# Patient Record
Sex: FEMALE | URBAN - METROPOLITAN AREA
[De-identification: names, ages, dates, MRNs, and addresses within clinical notes are randomized per-mention and may not be internally consistent; named-entity substitution may affect disease eponyms.]

---

## 2024-06-12 ENCOUNTER — TELEPHONE (OUTPATIENT)
Dept: SURGERY | Age: 49
End: 2024-06-12

## 2024-06-12 NOTE — TELEPHONE ENCOUNTER
[facility] called inuring about a colonoscopy that patient had at [facility] with Dr. Hawk. Informed her that we no longer have access to those records and will need to contact [facility]. Fax number was provided. No further questions at this time.     Jose Drummond., MA

## 2024-08-06 ENCOUNTER — TELEPHONE (OUTPATIENT)
Dept: SURGERY | Age: 49
End: 2024-08-06

## 2024-08-06 NOTE — TELEPHONE ENCOUNTER
Tried to call the pt to clarify a fax received and get a verbal release of information and it was a wrong number. Tried calling the office and it was closed. Faxed over a request to [facility] for a CAR[facility] and updated demographics on pt.

## 2024-08-09 ENCOUNTER — TELEPHONE (OUTPATIENT)
Dept: PULMONOLOGY | Age: 49
End: 2024-08-09

## 2024-08-09 NOTE — TELEPHONE ENCOUNTER
Called dr office of Dr. Raygoza and tried to clarify the pt information. It was all correct. Per the clinical staff [facility], pt had given Dr. Hawk name of her last colonoscopy but did not give date or location. The office requested the last notes and per our system in [facility] and [facility] there is no history with this pt. At this time no further action will be taken.